# Patient Record
Sex: FEMALE | Race: BLACK OR AFRICAN AMERICAN | NOT HISPANIC OR LATINO | URBAN - METROPOLITAN AREA
[De-identification: names, ages, dates, MRNs, and addresses within clinical notes are randomized per-mention and may not be internally consistent; named-entity substitution may affect disease eponyms.]

---

## 2017-03-30 ENCOUNTER — EMERGENCY (EMERGENCY)
Facility: HOSPITAL | Age: 57
LOS: 1 days | Discharge: HOME | End: 2017-03-30
Attending: EMERGENCY MEDICINE | Admitting: EMERGENCY MEDICINE
Payer: MEDICAID

## 2017-03-30 VITALS
RESPIRATION RATE: 16 BRPM | HEART RATE: 58 BPM | DIASTOLIC BLOOD PRESSURE: 68 MMHG | SYSTOLIC BLOOD PRESSURE: 129 MMHG | HEIGHT: 64 IN | TEMPERATURE: 98 F | WEIGHT: 201.94 LBS | OXYGEN SATURATION: 98 %

## 2017-03-30 DIAGNOSIS — M25.512 PAIN IN LEFT SHOULDER: ICD-10-CM

## 2017-03-30 DIAGNOSIS — R51 HEADACHE: ICD-10-CM

## 2017-03-30 DIAGNOSIS — M54.2 CERVICALGIA: ICD-10-CM

## 2017-03-30 DIAGNOSIS — Z79.899 OTHER LONG TERM (CURRENT) DRUG THERAPY: ICD-10-CM

## 2017-03-30 DIAGNOSIS — E11.9 TYPE 2 DIABETES MELLITUS WITHOUT COMPLICATIONS: ICD-10-CM

## 2017-03-30 DIAGNOSIS — M25.511 PAIN IN RIGHT SHOULDER: ICD-10-CM

## 2017-03-30 DIAGNOSIS — I10 ESSENTIAL (PRIMARY) HYPERTENSION: ICD-10-CM

## 2017-03-30 DIAGNOSIS — Z98.891 HISTORY OF UTERINE SCAR FROM PREVIOUS SURGERY: Chronic | ICD-10-CM

## 2017-03-30 PROCEDURE — 99284 EMERGENCY DEPT VISIT MOD MDM: CPT

## 2017-03-30 PROCEDURE — 70450 CT HEAD/BRAIN W/O DYE: CPT | Mod: 26

## 2017-03-30 RX ORDER — KETOROLAC TROMETHAMINE 30 MG/ML
60 SYRINGE (ML) INJECTION ONCE
Qty: 0 | Refills: 0 | Status: DISCONTINUED | OUTPATIENT
Start: 2017-03-30 | End: 2017-03-30

## 2017-03-30 RX ADMIN — Medication 60 MILLIGRAM(S): at 22:33

## 2017-03-30 RX ADMIN — Medication 60 MILLIGRAM(S): at 22:03

## 2017-03-30 NOTE — ED PROVIDER NOTE - OBJECTIVE STATEMENT
55 y/o F with PMH of HTN and NIDDM presents c/o constant 8/10 aching occipital headache with pain to neck and B/L shoulders x 3 days. Pt does not recall what she was doing when pain started and denies any recent injury/trauma. She has taken OTC tylenol with only transient relief. States her neck feels "tight" but is able to fully range it. She also reports slightly blurred vision with difficulty focusing when reading. No hx of similar episodes in the past.    Denies fever, chills, dizziness, confusion, LOC, diplopia, CP, SOB, palpitations, abdo pain, N/V, back pain, urinary or bowel incontinence, paresthesias, numbness, weakness or difficulty ambulating

## 2017-03-30 NOTE — ED PROVIDER NOTE - MEDICAL DECISION MAKING DETAILS
Pt improved with Toradol in ED. A&Ox3. NAD. Sitting comfortably. No focal neurodeficits. Will D/C with instructions to F/u with Dr. Montez in 24-48 hours. Strict return precautions reviewed with pt in which pt verbalizes understanding and agrees to.

## 2017-03-30 NOTE — ED ADULT TRIAGE NOTE - CHIEF COMPLAINT QUOTE
Pt reports head, neck and shoulder pain since last weekend, worsening since then. Denies any injuries or physical activities that may be causing pain. States she took tylenol with mild relief. Denies nausea, vomiting, blurry vision, dizziness, fevers or chills

## 2017-09-21 ENCOUNTER — TRANSCRIPTION ENCOUNTER (OUTPATIENT)
Age: 57
End: 2017-09-21

## 2017-10-26 ENCOUNTER — EMERGENCY (EMERGENCY)
Facility: HOSPITAL | Age: 57
LOS: 1 days | Discharge: HOME | End: 2017-10-26
Admitting: EMERGENCY MEDICINE
Payer: MEDICAID

## 2017-10-26 VITALS
DIASTOLIC BLOOD PRESSURE: 93 MMHG | RESPIRATION RATE: 17 BRPM | HEART RATE: 61 BPM | SYSTOLIC BLOOD PRESSURE: 182 MMHG | TEMPERATURE: 98 F | OXYGEN SATURATION: 97 %

## 2017-10-26 DIAGNOSIS — Z98.891 HISTORY OF UTERINE SCAR FROM PREVIOUS SURGERY: Chronic | ICD-10-CM

## 2017-10-26 DIAGNOSIS — R21 RASH AND OTHER NONSPECIFIC SKIN ERUPTION: ICD-10-CM

## 2017-10-26 DIAGNOSIS — E11.9 TYPE 2 DIABETES MELLITUS WITHOUT COMPLICATIONS: ICD-10-CM

## 2017-10-26 DIAGNOSIS — Z79.899 OTHER LONG TERM (CURRENT) DRUG THERAPY: ICD-10-CM

## 2017-10-26 DIAGNOSIS — M25.522 PAIN IN LEFT ELBOW: ICD-10-CM

## 2017-10-26 DIAGNOSIS — I10 ESSENTIAL (PRIMARY) HYPERTENSION: ICD-10-CM

## 2017-10-26 PROCEDURE — 99283 EMERGENCY DEPT VISIT LOW MDM: CPT

## 2017-10-26 RX ORDER — METFORMIN HYDROCHLORIDE 850 MG/1
0 TABLET ORAL
Qty: 0 | Refills: 0 | COMMUNITY

## 2017-10-26 RX ORDER — DIPHENHYDRAMINE HCL 50 MG
50 CAPSULE ORAL ONCE
Qty: 0 | Refills: 0 | Status: COMPLETED | OUTPATIENT
Start: 2017-10-26 | End: 2017-10-26

## 2017-10-26 RX ORDER — AZTREONAM 2 G
1 VIAL (EA) INJECTION
Qty: 20 | Refills: 0 | OUTPATIENT
Start: 2017-10-26 | End: 2017-11-05

## 2017-10-26 RX ADMIN — Medication 1 TABLET(S): at 18:46

## 2017-10-26 RX ADMIN — Medication 50 MILLIGRAM(S): at 18:46

## 2017-10-26 NOTE — ED PROVIDER NOTE - OBJECTIVE STATEMENT
56 y/o Female with a hx of DM presents to the ED c/o left elbow itchiness, swelling, and warmth. Pt states 2 weeks ago she went to an urgent care and was given abx (Keflex 500mg twice a day) , last dose was yesterday. This morning swelling, itchiness, and warmth came back this morning. States swelling is the same size before abx use. Denies pain, fever, chills, trauma to the elbow, and recent bites.

## 2017-10-26 NOTE — ED PROVIDER NOTE - MEDICAL DECISION MAKING DETAILS
pt. with erythema to lt elbow , elbow FROM , no TTP, was on abx ( keflex) , will place on bactrim /benadryl, rash look reactive/allergic however given h/o DM will start bactrim, wound check in 48 hrs.

## 2017-10-26 NOTE — ED ADULT NURSE NOTE - OBJECTIVE STATEMENT
c/o left elbow swelling redness and itching returned after finishing a course of antibiotics, states it began 2 weeks ago, unknown etiology

## 2018-01-22 NOTE — ED PROVIDER NOTE - EXITCARE/DISCHARGE INSTRUCTIONS
No significant past surgical history
Launch [employer] and print the 'Prescriptions from this Visit' Report

## 2021-07-02 NOTE — ED ADULT NURSE NOTE - CAS DISCH TRANSFER METHOD
walked Azathioprine Pregnancy And Lactation Text: This medication is Pregnancy Category D and isn't considered safe during pregnancy. It is unknown if this medication is excreted in breast milk.

## 2023-04-07 PROBLEM — I10 ESSENTIAL (PRIMARY) HYPERTENSION: Chronic | Status: ACTIVE | Noted: 2017-03-30

## 2023-04-07 PROBLEM — E11.9 TYPE 2 DIABETES MELLITUS WITHOUT COMPLICATIONS: Chronic | Status: ACTIVE | Noted: 2017-03-30

## 2023-08-28 ENCOUNTER — APPOINTMENT (OUTPATIENT)
Dept: NEUROLOGY | Facility: CLINIC | Age: 63
End: 2023-08-28

## 2024-01-22 ENCOUNTER — APPOINTMENT (OUTPATIENT)
Dept: NEUROLOGY | Facility: CLINIC | Age: 64
End: 2024-01-22

## 2024-05-22 PROBLEM — Z00.00 ENCOUNTER FOR PREVENTIVE HEALTH EXAMINATION: Status: ACTIVE | Noted: 2024-05-22

## 2024-05-28 ENCOUNTER — APPOINTMENT (OUTPATIENT)
Dept: NEUROLOGY | Facility: CLINIC | Age: 64
End: 2024-05-28

## 2024-06-17 ENCOUNTER — APPOINTMENT (OUTPATIENT)
Dept: NEUROLOGY | Facility: CLINIC | Age: 64
End: 2024-06-17